# Patient Record
Sex: FEMALE | Race: WHITE | NOT HISPANIC OR LATINO | URBAN - METROPOLITAN AREA
[De-identification: names, ages, dates, MRNs, and addresses within clinical notes are randomized per-mention and may not be internally consistent; named-entity substitution may affect disease eponyms.]

---

## 2021-02-04 ENCOUNTER — OFFICE VISIT (OUTPATIENT)
Dept: URBAN - METROPOLITAN AREA CLINIC 17 | Facility: CLINIC | Age: 66
End: 2021-02-04
Payer: COMMERCIAL

## 2021-02-04 DIAGNOSIS — H25.13 AGE-RELATED NUCLEAR CATARACT, BILATERAL: ICD-10-CM

## 2021-02-04 DIAGNOSIS — E11.9 TYPE 2 DIABETES MELLITUS W/O COMPLICATION: Primary | ICD-10-CM

## 2021-02-04 PROCEDURE — 99203 OFFICE O/P NEW LOW 30 MIN: CPT | Performed by: OPTOMETRIST

## 2021-02-04 ASSESSMENT — INTRAOCULAR PRESSURE
OS: 15
OD: 16

## 2022-01-01 NOTE — IMPRESSION/PLAN
[facility] Progress Note      Assessment:  Atif Cruz is a 2 day old old infant born at Gestational Age: 37w6d via Vaginal, Vacuum (Extractor) delivery on 2022 at 2:32 AM.   Patient Active Problem List   Diagnosis      infant of 37 completed weeks of gestation      affected by delivery by vacuum extraction     Fetal distress first noted during labor and delivery, in liveborn infant      abstinence syndrome     SGA (small for gestational age)        abstinence syndrome: scoring is stable. Please see prior notes from this author, lactation consult, and SW note for additional details/background. Known agent, oxycodone, minimum stay for 72 hours but consider holding until day 4-5 as well. Social work to determine safe discharge tomorrow. Anticipate cord drug study to return tomorrow.     With mom, patient has been breastfeeding, but as mom is home today they have been using exclusive formula. Although not discussed today, would need to discuss prior to discharge that frequent/repeated use of oxycodone is advised against when breastfeeding (if determined to be a safe discharge)      Plan:  routine cares  Continue BECKY scoring  Needs to have SW eval prior to discharge to determine safe discharge  Follow up on cord studies  anticipate discharge in 1-3 days, as early as tomorrow given short acting opioid, but consider additional monitoring as clinically indicated.   Should have close monitoring post discharge    Dr. Powell to resume cares tomorrow      __________________________________________________________________       Name: Atif Cruz   : 2020   MRN:  [id number]    Subjective:  DOL#2 days for this infant born  on 2020 at Gestational Age: 37w6d.   Feeding Method: Maternal Expressed Breastmilk for nutrition.      Hospital Course:  Feeding well: yes per dad report  Output: voiding and stooling normally  Concerns: yes, continued BECKY  Impression: Type 2 diabetes mellitus w/o complication: K07.9. Plan: Diabetes type II: no background retinopathy, no signs of neovascularization noted. Discussed ocular and systemic benefits of blood sugar control. scoring, 5-6 over past 24 hours    Physical Exam:    Birth Weight: 2.551 kg (5 lb 10 oz) (Filed from Delivery Summary)  Today's weight: Weight: 2.41 kg (5 lb 5 oz)  % weight change: -5.56 %    Medications   sucrose (SWEET-EASE) solution 0.2-2 mL (0.2 mLs Oral Given 22 0300)   mineral oil-hydrophilic petrolatum (AQUAPHOR) (has no administration in time range)   glucose gel 600 mg (600 mg Buccal Given 22 0700)   phytonadione (AQUA-MEPHYTON) injection 1 mg (1 mg Intramuscular Given 22 0415)   erythromycin (ROMYCIN) ophthalmic ointment (1 g Both Eyes Given 22 041)   hepatitis b vaccine recombinant (ENGERIX-B) injection 10 mcg (10 mcg Intramuscular Given 22 0415)       Temp:  [98.3  F (36.8  C)-99.2  F (37.3  C)] 98.6  F (37  C)  Pulse:  [118-144] 144  Resp:  [36-55] 42  Gen:  Alert, vigorous  Head:  Atraumatic, anterior fontanelle soft and flat  Heart:  Regular without murmur  Lungs:  Clear bilaterally    Abd:  Soft, nondistended  Skin: No significant jaundice, no significant rash  Neuro: jittery and easily agitated      Yoly  Abstinence Score     0800  5    0430  5    0003  6    2000  5    1600  5    1230  6    0800  6    0005  5   2010  5    1600  6    1230  3    0800  4          SCREENING RESULTS:   Hearing Screen:   22  Hearing Screening Method: ABR  Hearing Screen, Left Ear: passed  Hearing Screen, Right Ear: passed     CCHD Screen:     Critical Congen Heart Defect Test Date: 22  Right Hand (%): 100 %  Foot (%): 100 %  Critical Congenital Heart Screen Result: pass     Metabolic Screen:   Completed      Labs:  Results for orders placed or performed during the hospital encounter of 22   Glucose by meter     Status: Normal   Result Value Ref Range    GLUCOSE BY METER POCT 66 40 - 99 mg/dL   Glucose by meter     Status: Normal   Result Value Ref Range    GLUCOSE BY METER POCT 69 40 - 99  mg/dL   Glucose by meter     Status: Abnormal   Result Value Ref Range    GLUCOSE BY METER POCT 31 (LL) 40 - 99 mg/dL   Glucose by meter     Status: Normal   Result Value Ref Range    GLUCOSE BY METER POCT 44 40 - 99 mg/dL   Glucose by meter     Status: Normal   Result Value Ref Range    GLUCOSE BY METER POCT 68 40 - 99 mg/dL   Glucose by meter     Status: Normal   Result Value Ref Range    GLUCOSE BY METER POCT 54 40 - 99 mg/dL   Glucose by meter     Status: Normal   Result Value Ref Range    GLUCOSE BY METER POCT 60 40 - 99 mg/dL   Bilirubin Direct and Total     Status: Abnormal   Result Value Ref Range    Bilirubin Total 7.1 (H) 0.0 - 7.0 mg/dL    Bilirubin Direct 0.2 <=0.5 mg/dL    Bilirubin Indirect 6.9 0.0 - 7.0 mg/dL   Glucose     Status: Normal   Result Value Ref Range    Glucose 74 53 - 93 mg/dL   Cord blood study     Status: None   Result Value Ref Range    ABO/RH(D) O POS     KEISHA Anti-IgG NEG Negative    SPECIMEN EXPIRATION DATE [id number]     ABORH REPEAT O POS             Jamil Gomez MD, M.D.  [facility]   2022 12:42 PM